# Patient Record
Sex: FEMALE | Race: WHITE | NOT HISPANIC OR LATINO | Employment: UNEMPLOYED | URBAN - NONMETROPOLITAN AREA
[De-identification: names, ages, dates, MRNs, and addresses within clinical notes are randomized per-mention and may not be internally consistent; named-entity substitution may affect disease eponyms.]

---

## 2023-02-07 ENCOUNTER — OFFICE VISIT (OUTPATIENT)
Dept: INTERNAL MEDICINE | Facility: CLINIC | Age: 23
End: 2023-02-07
Payer: MEDICAID

## 2023-02-07 VITALS
DIASTOLIC BLOOD PRESSURE: 80 MMHG | BODY MASS INDEX: 24.98 KG/M2 | HEART RATE: 110 BPM | WEIGHT: 141 LBS | OXYGEN SATURATION: 98 % | SYSTOLIC BLOOD PRESSURE: 126 MMHG | HEIGHT: 63 IN | TEMPERATURE: 99.5 F

## 2023-02-07 DIAGNOSIS — K59.00 CONSTIPATION, UNSPECIFIED CONSTIPATION TYPE: ICD-10-CM

## 2023-02-07 DIAGNOSIS — R19.5 MUCUS IN STOOL: ICD-10-CM

## 2023-02-07 DIAGNOSIS — R45.86 MOOD SWINGS: ICD-10-CM

## 2023-02-07 DIAGNOSIS — F33.1 MODERATE EPISODE OF RECURRENT MAJOR DEPRESSIVE DISORDER: ICD-10-CM

## 2023-02-07 DIAGNOSIS — F41.9 ANXIETY: ICD-10-CM

## 2023-02-07 DIAGNOSIS — Z76.89 ENCOUNTER TO ESTABLISH CARE: Primary | ICD-10-CM

## 2023-02-07 DIAGNOSIS — F51.04 PSYCHOPHYSIOLOGIC INSOMNIA: ICD-10-CM

## 2023-02-07 PROCEDURE — 99204 OFFICE O/P NEW MOD 45 MIN: CPT | Performed by: NURSE PRACTITIONER

## 2023-02-07 RX ORDER — HYDROXYZINE HYDROCHLORIDE 25 MG/1
25 TABLET, FILM COATED ORAL NIGHTLY
Qty: 90 TABLET | Refills: 3 | Status: SHIPPED | OUTPATIENT
Start: 2023-02-07

## 2023-02-07 RX ORDER — POLYETHYLENE GLYCOL 3350 17 G/17G
17 POWDER, FOR SOLUTION ORAL DAILY PRN
Qty: 30 EACH | Refills: 3 | Status: SHIPPED | OUTPATIENT
Start: 2023-02-07

## 2023-02-07 NOTE — PROGRESS NOTES
Date: 2023    Name: Leisa Person  : 2000    Chief Complaint:   Chief Complaint   Patient presents with   • Establish Care     Blood in stool, constipated, pain       HPI:  Leisa Person is a 22 y.o. female presents to establish care.      Anxiety, depression: took medication in middle school.  Believes it was Effexor. Anxiety and depression increased after having her son ~2 years ago.  Has mood swings. Goes from being happy to angry/irritated/sad quickly.  Partner will say one thing to her that causes irritation and anger that is not meant to provoke her. She won't speak to him for 2-3 days.  Not sleeping well, due to persistent worrying. Denies anhedonia, difficulty concentrating, impaired memory, hallucinations, SI, HI, panic attacks, weight change, abnormal fatigue, weight change, temperature intolerance, hair/skin/nail changes, bowel habit changes, palpitations, sore throat, hoarseness. .    Rare constipation.  Occasionally sees white mucus on stool. Has BRBPR after constipation.  She has taken Miralax prn for constipation.   When not constipated, stool is thin in diameter.  Denies early satiety, weight change.  Tries to stay well-hydrated.     History:  LMP: 25  Menarche: 13 years old  Sexual activity: bisexual, monogamous relationship.  for 3 years  Last pap date: not yet indicated  : 1  Para: 1    Do you take any herbs or supplements that were not prescribed by a doctor? no      Health Habits:  Dental Exam. up to date  Eye Exam. up to date  Exercise: 1-2 times/week.  Current exercise activities include: jumping jacks, squats, pushups  Current diet: typical American diet    History:    History reviewed. No pertinent past medical history.    History reviewed. No pertinent surgical history.    Family History   Problem Relation Age of Onset   • Anxiety disorder Father    • Depression Father    • Diabetes Father        Social History     Socioeconomic History   • Marital status:  "Single   Tobacco Use   • Smoking status: Never   • Smokeless tobacco: Never   Vaping Use   • Vaping Use: Every day   • Substances: Nicotine, Flavoring   • Devices: Disposable   Substance and Sexual Activity   • Alcohol use: Never   • Drug use: Never   • Sexual activity: Yes     Partners: Male     Birth control/protection: None       No Known Allergies      Current Outpatient Medications:   •  hydrOXYzine (ATARAX) 25 MG tablet, Take 1 tablet by mouth Every Night., Disp: 90 tablet, Rfl: 3  •  polyethylene glycol (MIRALAX) 17 g packet, Take 17 g by mouth Daily As Needed (constipation)., Disp: 30 each, Rfl: 3    ROS:  Review of Systems   All other systems reviewed and are negative.      VS:  Vitals:    02/07/23 1550   BP: 126/80   Pulse: 110   Temp: 99.5 °F (37.5 °C)   SpO2: 98%   Weight: 64 kg (141 lb)   Height: 160 cm (63\")     Body mass index is 24.98 kg/m².    PE:  Physical Exam  Constitutional:       Appearance: She is not ill-appearing.   HENT:      Head: Normocephalic.      Right Ear: External ear normal.      Left Ear: External ear normal.   Eyes:      Conjunctiva/sclera: Conjunctivae normal.      Pupils: Pupils are equal, round, and reactive to light.   Cardiovascular:      Rate and Rhythm: Normal rate and regular rhythm.      Pulses:           Radial pulses are 2+ on the right side and 2+ on the left side.        Dorsalis pedis pulses are 2+ on the right side and 2+ on the left side.      Heart sounds: Normal heart sounds.   Pulmonary:      Effort: Pulmonary effort is normal.      Breath sounds: Normal breath sounds.   Musculoskeletal:      Cervical back: Normal range of motion and neck supple.      Right lower leg: No edema.      Left lower leg: No edema.   Skin:     General: Skin is warm.      Capillary Refill: Capillary refill takes less than 2 seconds.   Neurological:      Mental Status: She is alert and oriented to person, place, and time.      Coordination: Coordination normal.      Gait: Gait normal. "   Psychiatric:         Attention and Perception: Attention normal.         Mood and Affect: Mood normal.         Speech: Speech normal.         Behavior: Behavior normal.       Assessment/Plan:     Diagnoses and all orders for this visit:    1. Encounter to establish care (Primary)    2. Anxiety  -     [facility] - Swab,; Future  3. Moderate episode of recurrent major depressive disorder (HCC)  -     [facility] - Swab,; Future  4. Mood swings  5. Psychophysiologic insomnia  -     hydrOXYzine (ATARAX) 25 MG tablet; Take 1 tablet by mouth Every Night.  Dispense: 90 tablet; Refill: 3        - Encouraged to take part in daily physical exercise.          - Eat healthy, well balanced diet; avoid sugary foods or beverages        - Abstain from alcohol and drugs        - Ensure good night's sleep by creating calm space in bedroom, avoiding screen time 1-2 hours before bed, no caffeine after 5 pm        - Talk to supportive family and friends, as needed        - Consider journaling, other creative way to express feelings, if needed    6. Constipation, unspecified constipation type  -     polyethylene glycol (MIRALAX) 17 g packet; Take 17 g by mouth Daily As Needed (constipation).  Dispense: 30 each; Refill: 3  - Stay well-hydrated.  Eat a healthy diet.  Daily physical activity recommended    7. Mucus in stool  -     Continue to monitor, will refer to gastroenterology if necessary.          Return in about 4 weeks (around 3/7/2023) for Annual.

## 2023-02-10 ENCOUNTER — PATIENT MESSAGE (OUTPATIENT)
Dept: INTERNAL MEDICINE | Facility: CLINIC | Age: 23
End: 2023-02-10
Payer: MEDICAID

## 2023-02-10 DIAGNOSIS — R19.5 DECREASED STOOL CALIBER: ICD-10-CM

## 2023-02-10 DIAGNOSIS — K59.00 CONSTIPATION, UNSPECIFIED CONSTIPATION TYPE: Primary | ICD-10-CM

## 2023-02-10 DIAGNOSIS — R19.5 MUCUS IN STOOL: ICD-10-CM

## 2023-02-10 NOTE — TELEPHONE ENCOUNTER
Ghislaine Arana MA 2/10/2023 8:36 AM EST    Please advise.    ----- Message -----  From: Leisa Person  Sent: 2/10/2023 12:56 AM EST  To: Torrey Thompson [place]  Subject: Gastroenterologist     Brice Cohen! I came in Tuesday and you asked if i wanted you to refer me to a Gastroenterologist and i wasn’t sure if it, is there any way you still can? I’m still having some problems with my stomach and i’m starting to freak myself out lol. Please get back to me as soon as you can! Thank you.